# Patient Record
(demographics unavailable — no encounter records)

---

## 2024-10-07 NOTE — ADDENDUM
[FreeTextEntry1] : I, Meenakshi Lopez, acted solely as a scribe for Dr. Andrew Solorzano MD on this date 10/07/2024  All medical record entries made by the Scribe were at my, Dr. Andrew Solorzano MD., direction and personally dictated by me on 10/07/2024. I have reviewed the chart and agree that the record accurately reflects my personal performance of the history, physical exam, assessment and plan. I have also personally directed, reviewed, and agreed with the chart.

## 2024-10-07 NOTE — ASSESSMENT
[FreeTextEntry1] : I had a long discussion with the patient in regard to her treatment plan and diagnosis. 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 She has not yet had an epidural steroid injection. In my opinion her MRI findings do match her clinical exam. She has tried and failed significant conservative management including therapy, analgesics, etc. Unfortunately, their symptoms have persisted. Given her lack of improvement with conservative management, the fact that her imagining findings match her clinical exam I do think that she is an appropriate surgical candidate.  We discussed a L5-S1 microdiscectomy surgery. However, at the time the patient would like to hold-off on surgical treatment and would like to continue to exhaust conservative management, including an epidural steroid injection. The patient will continue to exhaust physical therapy/home exercise program, Tylenol, NSAIDs as medically indicated.  We will get her set up for an epidural steroid injection to try to help with her symptoms.. The patient will follow up with me when she comes to a decision or decides to pursue further treatment. I encouraged the patient to follow-up sooner if there are any new or worsening symptoms.  I did discuss with the patient the goal of surgery would be to help with her pain control.  We would hopefully also help with her numbness and weakness.  She understood this.  All questions were answered.

## 2024-10-07 NOTE — HISTORY OF PRESENT ILLNESS
[10] : an average pain level of 10/10 [de-identified] : Patient is a 63y/o female who presents for initial evaluation of low back pain into her buttock and it radiates down her left leg causing numbness. She fell by a swimming pool in August 2024. When she walks, she begins to feel like she is dragging her left foot. Denies any bowel/bladder incontinence. Denies any saddle anesthesia.

## 2024-10-07 NOTE — PHYSICAL EXAM
[de-identified] : Lumbar Physical Exam   Antalgic gait  Reflexes  Patellar - normal  Gastroc - normal  Clonus - No   Hip Exam - Normal   Straight leg raise - none   Pulses - 2+ dp/pt   Range of motion - normal   Sensation Sensation intact to light touch in L1, L2, L3, L4, L5 and S1 dermatomes bilaterally, numbness over left foot   Motor  IP Quad HS TA Gastroc EHL   Right       5/5              5/5         5/5           5/5              5/5        5/5  Left         5/5              5/5          5/5           3/5             5/5        2/5 [de-identified] : Images from [facility]:

## 2025-01-31 NOTE — HISTORY OF PRESENT ILLNESS
[10] : an average pain level of 10/10 [de-identified] : Patient is a 63y/o female who presents for a follow up evaluation of low back pain into her buttock and it radiates down her left leg causing numbness. She has paresthesias in her left posterolateral thigh and posterolateral calf. She reports that she has unchanged pain from her previous visit. She states that she has received an epidural injection which did not provide relief. She has stiffness in her back when ascending stairs.   10.07.24 Patient is a 63y/o female who presents for initial evaluation of low back pain into her buttock and it radiates down her left leg causing numbness. She fell by a swimming pool in August 2024. When she walks, she begins to feel like she is dragging her left foot. Denies any bowel/bladder incontinence. Denies any saddle anesthesia.

## 2025-01-31 NOTE — ASSESSMENT
[FreeTextEntry1] : I had a lengthy discussion with the patient in regard to their treatment plan and diagnosis. Their symptoms have persisted despite the conservative management they have attempted thus far. We discussed surgical intervention. Risks/benefits of surgery were discussed. The patient would like to exhaust conservative care. We discussed long term dysfunction with nonoperative treatment. As a result, I would like to proceed with a referral to a pain management specialist to see if they are eligible for an epidural injection and other non-operative treatment options. In tandem with this, she can try Acupuncture treatment. The patient can take Tylenol/NSAIDs as needed for pain control if medically able to. I will have the patient follow up in 4 to 5 weeks for repeat clinical evaluation. I encouraged the patient to follow-up sooner if there are any new or worsening symptoms.

## 2025-01-31 NOTE — ADDENDUM
[FreeTextEntry1] :  I, Pauly Lujan, acted solely as a scribe for Dr. Andrew Solorzano MD on this date 01/31/2025    All medical record entries made by the Scribe were at my, Dr. Andrew Solorzano MD., direction and personally dictated by me on 01/31/2025 . I have reviewed the chart and agree that the record accurately reflects my personal performance of the history, physical exam, assessment and plan. I have also personally directed, reviewed, and agreed with the chart.

## 2025-01-31 NOTE — PHYSICAL EXAM
[de-identified] : Lumbar Physical Exam   Antalgic gait  Reflexes  Patellar - normal  Gastroc - normal  Clonus - No   Hip Exam - Normal   Straight leg raise - none   Pulses - 2+ dp/pt   Range of motion - normal   Sensation Sensation intact to light touch in L1, L2, L3, L4, L5 and S1 dermatomes bilaterally, numbness over left foot  Motor  IP Quad HS TA Gastroc EHL   Right       5/5              5/5         5/5           5/5              5/5        5/5  Left         5/5              5/5          5/5           3+/5             3+/5        3+/5 [de-identified] : Images from [facility]: